# Patient Record
Sex: MALE | Race: WHITE | NOT HISPANIC OR LATINO | URBAN - METROPOLITAN AREA
[De-identification: names, ages, dates, MRNs, and addresses within clinical notes are randomized per-mention and may not be internally consistent; named-entity substitution may affect disease eponyms.]

---

## 2018-05-09 ENCOUNTER — OFFICE (OUTPATIENT)
Dept: URBAN - METROPOLITAN AREA CLINIC 42 | Facility: CLINIC | Age: 73
End: 2018-05-09

## 2018-05-09 VITALS — SYSTOLIC BLOOD PRESSURE: 139 MMHG | WEIGHT: 180 LBS | DIASTOLIC BLOOD PRESSURE: 87 MMHG | HEART RATE: 76 BPM

## 2018-05-09 DIAGNOSIS — K64.4 RESIDUAL HEMORRHOIDAL SKIN TAGS: ICD-10-CM

## 2018-05-09 DIAGNOSIS — Z86.010 PERSONAL HISTORY OF COLONIC POLYPS: ICD-10-CM

## 2018-05-09 DIAGNOSIS — Z80.9 FAMILY HISTORY OF MALIGNANT NEOPLASM, UNSPECIFIED: ICD-10-CM

## 2018-05-09 PROCEDURE — 99213 OFFICE O/P EST LOW 20 MIN: CPT

## 2018-08-08 ENCOUNTER — ON CAMPUS - OUTPATIENT (OUTPATIENT)
Dept: URBAN - METROPOLITAN AREA HOSPITAL 91 | Facility: HOSPITAL | Age: 73
End: 2018-08-08
Payer: COMMERCIAL

## 2018-08-08 DIAGNOSIS — Z86.010 PERSONAL HISTORY OF COLONIC POLYPS: ICD-10-CM

## 2018-08-08 DIAGNOSIS — D12.3 BENIGN NEOPLASM OF TRANSVERSE COLON: ICD-10-CM

## 2018-08-08 DIAGNOSIS — D12.2 BENIGN NEOPLASM OF ASCENDING COLON: ICD-10-CM

## 2018-08-08 DIAGNOSIS — K57.30 DIVERTICULOSIS OF LARGE INTESTINE WITHOUT PERFORATION OR ABS: ICD-10-CM

## 2018-08-08 PROCEDURE — 45385 COLONOSCOPY W/LESION REMOVAL: CPT | Mod: PT

## 2019-08-07 ENCOUNTER — OFFICE (OUTPATIENT)
Dept: URBAN - METROPOLITAN AREA CLINIC 42 | Facility: CLINIC | Age: 74
End: 2019-08-07

## 2019-08-07 VITALS
SYSTOLIC BLOOD PRESSURE: 133 MMHG | HEIGHT: 72 IN | TEMPERATURE: 97.9 F | HEART RATE: 72 BPM | WEIGHT: 188 LBS | DIASTOLIC BLOOD PRESSURE: 65 MMHG

## 2019-08-07 DIAGNOSIS — K21.9 GASTRO-ESOPHAGEAL REFLUX DISEASE WITHOUT ESOPHAGITIS: ICD-10-CM

## 2019-08-07 DIAGNOSIS — K30 FUNCTIONAL DYSPEPSIA: ICD-10-CM

## 2019-08-07 PROCEDURE — 99213 OFFICE O/P EST LOW 20 MIN: CPT

## 2019-08-07 RX ORDER — OMEPRAZOLE 20 MG/1
CAPSULE, DELAYED RELEASE ORAL
Qty: 90 | Refills: 3 | Status: ACTIVE

## 2023-04-05 ENCOUNTER — HOSPITAL ENCOUNTER (OUTPATIENT)
Dept: PHYSICAL THERAPY | Facility: HOSPITAL | Age: 78
Setting detail: THERAPIES SERIES
Discharge: HOME | End: 2023-04-05
Payer: MEDICARE

## 2023-04-05 DIAGNOSIS — S72.001D CLOSED FRACTURE OF NECK OF RIGHT FEMUR WITH ROUTINE HEALING, SUBSEQUENT ENCOUNTER: Primary | ICD-10-CM

## 2023-04-05 PROCEDURE — 97110 THERAPEUTIC EXERCISES: CPT | Performed by: PHYSICAL THERAPIST

## 2023-04-05 PROCEDURE — 97161 PT EVAL LOW COMPLEX 20 MIN: CPT | Performed by: PHYSICAL THERAPIST

## 2023-04-05 NOTE — THERAPY EVALUATION
Outpatient Physical Therapy Ortho Initial Evaluation   Mary Turner     Patient Name: Tapan Joel  : 1945  MRN: [id number]  Today's Date: 2023      Visit Date: 2023    There is no problem list on file for this patient.       No past medical history on file.     No past surgical history on file.    Visit Dx:     ICD-10-CM ICD-9-CM   1. Closed fracture of neck of right femur with routine healing, subsequent encounter  S72.001D V54.13          Patient History     Row Name 23 0930             History    Chief Complaint Difficulty Walking;Difficulty with daily activities;Pain;Muscle weakness  -GC      Type of Pain Hip pain  right  -GC      Brief Description of Current Complaint Pt states he slipped on ice and fell landing on his right hip 2023. He underwent ORIF of lright femopral neck fracture on . he hospitalized for several days before spending 22 days in a [facility] facility. He is now referred of [facility] patient therapy.  -      Patient/Caregiver Goals Relieve pain;Return to prior level of function;Improve mobility;Improve strength  -GC      Patient's Rating of General Health Good  -GC      Hand Dominance right-handed  -GC      What clinical tests have you had for this problem? X-ray  -         Pain     Pain Location Hip  right  -GC      Pain at Present 3  -GC      Pain at Best 2  -GC      Pain at Worst 10  -GC      Pain Frequency Constant/continuous  -GC      Pain Description Aching;Discomfort;Sore;Sharp  -GC      What Performance Factors Make the Current Problem(s) WORSE? Pt c/o pain with walking, lifting his right LE  -GC      What Performance Factors Make the Current Problem(s) BETTER? Pt feels best at rest  -GC      Difficulties with ADL's? Pt has some difficulty with LE dressing, transitional movements  -         Daily Activities    Primary Language English  -GC      Are you able to read Yes  -GC      Are you able to write Yes  -GC      Teaching needs identified Home Exercise  Program;Management of Condition  -      Patient is concerned about/has problems with Difficulty with self care (i.e. bathing, dressing, toileting:;Performing home management (household chores, shopping, care of dependents);Performing job responsibilities/community activities (work, school,;Standing;Walking;Transfers (getting out of a chair, bed)  -GC      Does patient have problems with the following? None  -GC      Barriers to learning None  -GC      Functional Status mobility issues preventing performance of daily activities  -      Pt Participated in POC and Goals Yes  -GC         Safety    Are you being hurt, hit, or frightened by anyone at home or in your life? No  -GC      Are you being neglected by a caregiver No  -GC            User Key  (r) = Recorded By, (t) = Taken By, (c) = Cosigned By    Initials Name Provider Type    Brando Meyers, PT Physical Therapist                                Therapy Education  Given: HEP, Symptoms/condition management, Pain management  Program: New  How Provided: Verbal, Demonstration, Written  Provided to: Patient  Level of Understanding: Teach back education performed, Verbalized, Demonstrated      PT OP Goals     Row Name 04/05/23 0930          PT Short Term Goals    STG Date to Achieve 05/03/23  -     STG 1 Decrease right hip pain to 3-4/10 with activity.  -     STG 2 Increase right hips strength to at least 4/5 all planes with testing.  -     STG 3 Pt will be independent with sit to/from supine transfers.  -     STG 4 Increase right hip AROM to WFL all planes with testing.  -     STG 5 Pt will be independent with his HEP issued by this therapist.  -        Long Term Goals    LTG Date to Achieve 05/31/23  -     LTG 1 Decrease right hip pain to 0-1/10 with activity.  -     LTG 2 Increase right hips strength to at least 4+/5 all planes with testing.  -     LTG 3 Pt will ambulate nromally on levels and stairs without assistive device.  -     LTG 4  Pt will be independent with all ADLs and hav ea LEFS score > 65.  -        Time Calculation    PT Goal Re-Cert Due Date 05/03/23  -           User Key  (r) = Recorded By, (t) = Taken By, (c) = Cosigned By    Initials Name Provider Type     Brando Garcia, PT Physical Therapist                 PT Assessment/Plan     Row Name 04/05/23 0930          PT Assessment    Functional Limitations Impaired gait;Limitation in home management;Limitations in community activities;Limitations in functional capacity and performance;Performance in leisure activities;Performance in self-care ADL  -     Impairments Range of motion;Pain;Muscle strength;Gait  -     Assessment Comments Pt presents approximately 8 weeks s/p ORIF of right femoral neck fracture. He is currently ambulating with a straight cane wiht significant antalgic gait. He requires assistance with his right LE with transitional movements. He rates his hip pan up to 10/10 at times and has decreased right hip ROM, decreased right LE strength, decreased ambulatory status, and decreased function secodnary to the above.  -     Rehab Potential Good  -     Patient/caregiver participated in establishment of treatment plan and goals Yes  -     Patient would benefit from skilled therapy intervention Yes  -        PT Plan    PT Frequency 1x/week;2x/week  -     Predicted Duration of Therapy Intervention (PT) 6 weeks  -     Planned CPT's? PT EVAL LOW COMPLEXITY: 38710;PT THER PROC EA 15 MIN: 08206;PT ELECTRICAL STIM UNATTEND: ;PT ELECTRICAL STIM ATTD EA 15 MIN: 46296  -     PT Plan Comments Pt is to continue his HEP daily  -           User Key  (r) = Recorded By, (t) = Taken By, (c) = Cosigned By    Initials Name Provider Type     Brando aGrcia, PT Physical Therapist                   OP Exercises     Row Name 04/05/23 0930             Exercise 1    Exercise Name 1 Supine clam shell vs theraband  -      Cueing 1 Verbal;Tactile  -      Reps 1 25   -GC      Time 1 green  -GC         Exercise 2    Exercise Name 2 Bridge vs theraband  -GC      Cueing 2 Verbal;Tactile  -GC      Reps 2 25  -GC      Time 2 green  -GC         Exercise 3    Exercise Name 3 Bridge with ball squeeze  -GC      Cueing 3 Verbal;Tactile  -GC      Reps 3 25  -GC         Exercise 4    Exercise Name 4 SLR  -GC      Cueing 4 Verbal;Tactile  -GC      Sets 4 2  -GC      Reps 4 10  -GC         Exercise 5    Exercise Name 5 Sidelying hip ABD  -GC      Cueing 5 Verbal;Tactile  -GC      Reps 5 20  -GC         Exercise 6    Exercise Name 6 SAQ  -GC      Cueing 6 Verbal;Tactile  -GC      Reps 6 25  -GC         Exercise 7    Exercise Name 7 LAQ  -GC      Cueing 7 Verbal;Tactile  -GC      Reps 7 25  -GC            User Key  (r) = Recorded By, (t) = Taken By, (c) = Cosigned By    Initials Name Provider Type    Brando Meyers PT Physical Therapist                              Outcome Measure Options: Lower Extremity Functional Scale (LEFS)  Lower Extremity Functional Index  Getting into or out of the bath: Moderate difficulty  Walking between rooms: A little bit of difficulty  Putting on your shoes or socks: A little bit of difficulty  Squatting: A little bit of difficulty  Lifting an object, like a bag of groceries from the floor: Quite a bit of difficulty  Performing light activities around your home: Quite a bit of difficulty  Performing heavy activities around your home: Quite a bit of difficulty  Getting into or out of a car: Quite a bit of difficulty  Walking 2 blocks: Quite a bit of difficulty  Walking a mile: Extreme difficulty or unable to perform activity  Going up or down 10 stairs (about 1 flight of stairs): Quite a bit of difficulty  Standing for 1 hour: Extreme difficulty or unable to perform activity  Sitting for 1 hour: Moderate difficulty  Running on even ground: Extreme difficulty or unable to perform activity  Running on uneven ground: Extreme difficulty or unable to perform  activity  Making sharp turns while running fast: Extreme difficulty or unable to perform activity  Hopping: Extreme difficulty or unable to perform activity  Rolling over in bed: Moderate difficulty      Time Calculation:     Start Time: 0930  Stop Time: 1026  Time Calculation (min): 56 min     Therapy Charges for Today     Code Description Service Date Service Provider Modifiers Qty    42994621466 HC PT EVAL LOW COMPLEXITY 2 4/5/2023 Brando Garcia, PT GP 1    20702611975 HC PT THER PROC EA 15 MIN 4/5/2023 Brando Garcia, PT GP 1          PT G-Codes  Outcome Measure Options: Lower Extremity Functional Scale (LEFS)         Brando Garcia, PT  4/5/2023

## 2023-04-10 ENCOUNTER — HOSPITAL ENCOUNTER (OUTPATIENT)
Dept: PHYSICAL THERAPY | Facility: HOSPITAL | Age: 78
Setting detail: THERAPIES SERIES
Discharge: HOME | End: 2023-04-10
Payer: MEDICARE

## 2023-04-10 DIAGNOSIS — S72.001D CLOSED FRACTURE OF NECK OF RIGHT FEMUR WITH ROUTINE HEALING, SUBSEQUENT ENCOUNTER: Primary | ICD-10-CM

## 2023-04-10 PROCEDURE — 97110 THERAPEUTIC EXERCISES: CPT | Performed by: PHYSICAL THERAPIST

## 2023-04-13 ENCOUNTER — HOSPITAL ENCOUNTER (OUTPATIENT)
Dept: PHYSICAL THERAPY | Facility: HOSPITAL | Age: 78
Setting detail: THERAPIES SERIES
Discharge: HOME | End: 2023-04-13
Payer: MEDICARE

## 2023-04-13 DIAGNOSIS — S72.001D CLOSED FRACTURE OF NECK OF RIGHT FEMUR WITH ROUTINE HEALING, SUBSEQUENT ENCOUNTER: Primary | ICD-10-CM

## 2023-04-13 PROCEDURE — 97110 THERAPEUTIC EXERCISES: CPT | Performed by: PHYSICAL THERAPIST

## 2023-04-13 NOTE — THERAPY TREATMENT NOTE
Outpatient Physical Therapy Ortho Treatment Note  [facility] [place]     Patient Name: Tapan Joel  : 1945  MRN: [id number]  Today's Date: 2023      Visit Date: 2023    Visit Dx:    ICD-10-CM ICD-9-CM   1. Closed fracture of neck of right femur with routine healing, subsequent encounter  S72.001D V54.13       There is no problem list on file for this patient.       No past medical history on file.     No past surgical history on file.                     PT Assessment/Plan     Row Name 23 0915          PT Assessment    Assessment Comments Pt is doing well with improving function and good tolerance to his exercise progression.  -GC        PT Plan    PT Plan Comments Pt is to continue his HEP daily.  -GC           User Key  (r) = Recorded By, (t) = Taken By, (c) = Cosigned By    Initials Name Provider Type    Brando Meyers, PT Physical Therapist                   OP Exercises     Row Name 23 0915             Subjective Comments    Subjective Comments Pt states he was able to walk up a hill this morning without too much difficulty.  -GC         Exercise 1    Exercise Name 1 Supine clam shell vs theraband  -GC      Cueing 1 Verbal;Tactile  -GC      Reps 1 30  -GC      Time 1 blue  -GC         Exercise 2    Exercise Name 2 Bridge vs theraband  -GC      Cueing 2 Verbal;Tactile  -GC      Reps 2 30  -GC      Time 2 blue  -GC         Exercise 3    Exercise Name 3 Bridge with ball squeeze  -GC      Cueing 3 Verbal;Tactile  -GC      Reps 3 30  -GC         Exercise 4    Exercise Name 4 SLR  -GC      Cueing 4 Verbal;Tactile  -GC      Reps 4 30  -GC         Exercise 5    Exercise Name 5 Sidelying hip ABD  -GC      Cueing 5 Verbal;Tactile  -GC      Reps 5 30  -GC         Exercise 6    Exercise Name 6 SAQ  -GC      Cueing 6 Verbal;Tactile  -GC      Reps 6 30  -GC         Exercise 7    Exercise Name 7 LAQ  -GC      Cueing 7 Verbal;Tactile  -GC      Reps 7 30  -GC         Exercise 8    Exercise Name 8  "Hip ABD in standing vs theraband  -GC      Cueing 8 Verbal;Tactile  -GC      Reps 8 25  -GC      Time 8 blue  -GC         Exercise 9    Exercise Name 9 Hip EXT in standing vs theraband  -GC      Cueing 9 Verbal;Tactile  -GC      Reps 9 25  -GC      Time 9 blue  -GC         Exercise 10    Exercise Name 10 Prone hip EXT  -GC      Cueing 10 Verbal;Tactile  -GC      Reps 10 30  -GC         Exercise 11    Exercise Name 11 Forward step ups on 6\" step  -GC      Cueing 11 Verbal;Tactile;Demo  -GC      Reps 11 10x ea  -GC         Exercise 12    Exercise Name 12 Lateral step overs on 6\" step  -GC      Cueing 12 Verbal;Tactile;Demo  -GC      Time 12 10x  -GC            User Key  (r) = Recorded By, (t) = Taken By, (c) = Cosigned By    Initials Name Provider Type     Brando Garcia, PT Physical Therapist                                                Time Calculation:   Start Time: 0915  Stop Time: 0958  Time Calculation (min): 43 min  Therapy Charges for Today     Code Description Service Date Service Provider Modifiers Qty    04853211614  PT THER PROC EA 15 MIN 4/13/2023 Brando Garcia, PT GP 2                    Brando Garcia, PT  4/13/2023     "

## 2023-04-18 ENCOUNTER — HOSPITAL ENCOUNTER (OUTPATIENT)
Dept: PHYSICAL THERAPY | Facility: HOSPITAL | Age: 78
Setting detail: THERAPIES SERIES
Discharge: HOME | End: 2023-04-18
Payer: MEDICARE

## 2023-04-18 DIAGNOSIS — S72.001D CLOSED FRACTURE OF NECK OF RIGHT FEMUR WITH ROUTINE HEALING, SUBSEQUENT ENCOUNTER: Primary | ICD-10-CM

## 2023-04-18 PROCEDURE — 97110 THERAPEUTIC EXERCISES: CPT

## 2023-04-18 NOTE — THERAPY TREATMENT NOTE
Outpatient Physical Therapy Ortho Treatment Note  [facility]ER [place]     Patient Name: Tapan Joel  : 1945  MRN: [id number]  Today's Date: 2023      Visit Date: 2023    Visit Dx:    ICD-10-CM ICD-9-CM   1. Closed fracture of neck of right femur with routine healing, subsequent encounter  S72.001D V54.13       There is no problem list on file for this patient.       No past medical history on file.     No past surgical history on file.                     PT Assessment/Plan     Row Name 23 0945          PT Assessment    Assessment Comments Pt shows improved tolerance and strength with prescribed exercises. Pt completed ther ex (B) LE  -KM        PT Plan    PT Plan Comments Continue per POC  -KM           User Key  (r) = Recorded By, (t) = Taken By, (c) = Cosigned By    Initials Name Provider Type    Bushra Bryant PTA Physical Therapist Assistant                   OP Exercises     Row Name 23 6145             Subjective Comments    Subjective Comments Pt states his hip is doing well and has been compliant with HEP  -KM         Exercise 1    Exercise Name 1 Supine clam shell vs theraband  -KM      Cueing 1 Verbal;Tactile  -KM      Reps 1 30  -KM      Time 1 blue  -KM         Exercise 2    Exercise Name 2 Bridge vs theraband  -KM      Cueing 2 Verbal;Tactile  -KM      Reps 2 30  -KM      Time 2 blue  -KM         Exercise 3    Exercise Name 3 Bridge with ball squeeze  -KM      Cueing 3 Verbal;Tactile  -KM      Reps 3 30  -KM         Exercise 4    Exercise Name 4 SLR  -KM      Cueing 4 Verbal;Tactile  -KM      Reps 4 30  -KM         Exercise 5    Exercise Name 5 Sidelying hip ABD  -KM      Cueing 5 Verbal;Tactile  -KM      Reps 5 30  -KM         Exercise 6    Exercise Name 6 SAQ  -KM      Cueing 6 Verbal;Tactile  -KM      Reps 6 30  -KM         Exercise 7    Exercise Name 7 LAQ  -KM      Cueing 7 Verbal;Tactile  -KM      Reps 7 30  -KM         Exercise 8    Exercise Name 8 Hip ABD in  "standing vs theraband  -KM      Cueing 8 Verbal;Tactile  -KM      Reps 8 30  -KM      Time 8 blue  -KM         Exercise 9    Exercise Name 9 Hip EXT in standing vs theraband  -KM      Cueing 9 Verbal;Tactile  -KM      Reps 9 30  -KM      Time 9 blue  -KM         Exercise 10    Exercise Name 10 Prone hip EXT  -KM      Cueing 10 Verbal;Tactile  -KM      Reps 10 30  -KM         Exercise 11    Exercise Name 11 Forward step ups on 6\" step  -KM      Cueing 11 Verbal;Tactile;Demo  -KM      Reps 11 15x ea  -KM         Exercise 12    Exercise Name 12 Lateral step overs on 6\" step  -KM      Cueing 12 Verbal;Tactile;Demo  -KM      Time 12 15x  -KM            User Key  (r) = Recorded By, (t) = Taken By, (c) = Cosigned By    Initials Name Provider Type    Bushra Bryant PTA Physical Therapist Assistant                                                Time Calculation:   Start Time: 0945  Stop Time: 1027  Time Calculation (min): 42 min  Therapy Charges for Today     Code Description Service Date Service Provider Modifiers Qty    76728496522 HC PT THER PROC EA 15 MIN 4/18/2023 Bushra Guadarrama PTA GP 1                    Bushra Guadarrama PTA  4/18/2023     "

## 2023-04-20 ENCOUNTER — HOSPITAL ENCOUNTER (OUTPATIENT)
Dept: PHYSICAL THERAPY | Facility: HOSPITAL | Age: 78
Setting detail: THERAPIES SERIES
Discharge: HOME | End: 2023-04-20
Payer: MEDICARE

## 2023-04-20 DIAGNOSIS — S72.001D CLOSED FRACTURE OF NECK OF RIGHT FEMUR WITH ROUTINE HEALING, SUBSEQUENT ENCOUNTER: Primary | ICD-10-CM

## 2023-04-20 PROCEDURE — 97110 THERAPEUTIC EXERCISES: CPT | Performed by: PHYSICAL THERAPIST

## 2023-04-20 NOTE — THERAPY TREATMENT NOTE
Outpatient Physical Therapy Ortho Treatment Note  [facility]IFER [facility]     Patient Name: Tapan Joel  : 1945  MRN: [id number]  Today's Date: 2023      Visit Date: 2023    Visit Dx:    ICD-10-CM ICD-9-CM   1. Closed fracture of neck of right femur with routine healing, subsequent encounter  S72.001D V54.13       There is no problem list on file for this patient.       No past medical history on file.     No past surgical history on file.     PT Ortho     Row Name 23 0900       MMT Right Lower Ext    Rt Hip Flexion MMT, Gross Movement (3+/5) fair plus  -GC    Rt Hip Extension MMT, Gross Movement (3/5) fair  -GC    Rt Hip ABduction MMT, Gross Movement (3+/5) fair plus  -GC       Transfers    Comment, (Transfers) Pt is independent with all bed mobility and transfers  -       Gait/Stairs (Locomotion)    Comment, (Gait/Stairs) Pt ambulates with mild antalgic gait using a straight cane. He has a positive Trendelenburg ambulating without assistive device  -GC          User Key  (r) = Recorded By, (t) = Taken By, (c) = Cosigned By    Initials Name Provider Type     Brando Garcia, PT Physical Therapist                             PT Assessment/Plan     Row Name 23          PT Assessment    Assessment Comments Pt is doing well with increased hip strength and improved functional mobility.  -GC        PT Plan    PT Plan Comments Pt is to continue his HEP daily.  -           User Key  (r) = Recorded By, (t) = Taken By, (c) = Cosigned By    Initials Name Provider Type    Brando Meyers, PT Physical Therapist                   OP Exercises     Row Name 23             Exercise 1    Exercise Name 1 Supine clam shell vs theraband  -GC      Cueing 1 Verbal;Tactile  -GC      Reps 1 30  -GC      Time 1 black  -GC         Exercise 2    Exercise Name 2 Bridge vs theraband  -GC      Cueing 2 Verbal;Tactile  -GC      Reps 2 30  -GC      Time 2 black  -GC         Exercise 3    Exercise Name  "3 Bridge with ball squeeze  -GC      Cueing 3 Verbal;Tactile  -GC      Reps 3 30  -GC         Exercise 4    Exercise Name 4 SLR  -GC      Cueing 4 Verbal;Tactile  -GC      Reps 4 30  -GC         Exercise 5    Exercise Name 5 Sidelying hip ABD  -GC      Cueing 5 Verbal;Tactile  -GC      Reps 5 30  -GC         Exercise 6    Exercise Name 6 SAQ  -GC      Cueing 6 Verbal;Tactile  -GC      Reps 6 30  -GC      Time 6 2#  -GC         Exercise 7    Exercise Name 7 LAQ  -GC      Cueing 7 Verbal;Tactile  -GC      Reps 7 30  -GC      Time 7 2#  -GC         Exercise 8    Exercise Name 8 Hip ABD in standing vs theraband  -GC      Cueing 8 Verbal;Tactile  -GC      Reps 8 30  -GC      Time 8 blue  -GC         Exercise 9    Exercise Name 9 Hip EXT in standing vs theraband  -GC      Cueing 9 Verbal;Tactile  -GC      Reps 9 30  -GC      Time 9 blue  -GC         Exercise 10    Exercise Name 10 Prone hip EXT  -GC      Cueing 10 Verbal;Tactile  -GC      Reps 10 30  -GC         Exercise 11    Exercise Name 11 Forward step ups on 6\" step  -GC      Cueing 11 Verbal;Tactile;Demo  -GC      Reps 11 15x ea  -GC         Exercise 12    Exercise Name 12 Lateral step overs on 6\" step  -GC      Cueing 12 Verbal;Tactile;Demo  -GC      Time 12 15x  -GC         Exercise 13    Exercise Name 13 Heel Raises  -GC      Cueing 13 Verbal;Tactile;Demo  -GC      Reps 13 30  -GC         Exercise 14    Exercise Name 14 Partial squats  -GC      Cueing 14 Verbal;Tactile;Demo  -GC      Reps 14 30  -GC            User Key  (r) = Recorded By, (t) = Taken By, (c) = Cosigned By    Initials Name Provider Type     Brando Garcia, PT Physical Therapist                                                Time Calculation:   Start Time: 0930  Stop Time: 1016  Time Calculation (min): 46 min  Therapy Charges for Today     Code Description Service Date Service Provider Modifiers Qty    55507221344 HC PT THER PROC EA 15 MIN 4/20/2023 Brando Garcia, PT GP 2                    Brando " Jose, PT  4/20/2023

## 2023-04-25 ENCOUNTER — HOSPITAL ENCOUNTER (OUTPATIENT)
Dept: PHYSICAL THERAPY | Facility: HOSPITAL | Age: 78
Setting detail: THERAPIES SERIES
Discharge: HOME | End: 2023-04-25
Payer: MEDICARE

## 2023-04-25 DIAGNOSIS — S72.001D CLOSED FRACTURE OF NECK OF RIGHT FEMUR WITH ROUTINE HEALING, SUBSEQUENT ENCOUNTER: Primary | ICD-10-CM

## 2023-04-25 PROCEDURE — 97110 THERAPEUTIC EXERCISES: CPT | Performed by: PHYSICAL THERAPIST

## 2023-04-27 ENCOUNTER — APPOINTMENT (OUTPATIENT)
Dept: PHYSICAL THERAPY | Facility: HOSPITAL | Age: 78
End: 2023-04-27
Payer: MEDICARE

## 2023-04-28 ENCOUNTER — HOSPITAL ENCOUNTER (OUTPATIENT)
Dept: PHYSICAL THERAPY | Facility: HOSPITAL | Age: 78
Setting detail: THERAPIES SERIES
Discharge: HOME | End: 2023-04-28
Payer: MEDICARE

## 2023-04-28 DIAGNOSIS — S72.001D CLOSED FRACTURE OF NECK OF RIGHT FEMUR WITH ROUTINE HEALING, SUBSEQUENT ENCOUNTER: Primary | ICD-10-CM

## 2023-04-28 PROCEDURE — 97110 THERAPEUTIC EXERCISES: CPT

## 2023-04-28 NOTE — THERAPY TREATMENT NOTE
Outpatient Physical Therapy Ortho Treatment Note  JENNIFER [facility]     Patient Name: Tapan Joel  : 1945  MRN: [id number]  Today's Date: 2023      Visit Date: 2023    Visit Dx:    ICD-10-CM ICD-9-CM   1. Closed fracture of neck of right femur with routine healing, subsequent encounter  S72.001D V54.13       There is no problem list on file for this patient.       No past medical history on file.     No past surgical history on file.                     PT Assessment/Plan     Row Name 23 1030          PT Assessment    Assessment Comments Pt ambulates with improved gait with minimal use of AD. Pt shows improved strength and stability with ther ex  -KM        PT Plan    PT Plan Comments With pts improved status, plan to see pt 1x/week. Pt to continue with HEP  -KM           User Key  (r) = Recorded By, (t) = Taken By, (c) = Cosigned By    Initials Name Provider Type    Bushra Bryant PTA Physical Therapist Assistant                   OP Exercises     Row Name 23 1030             Subjective Comments    Subjective Comments Pt states his hip is doing well.  -KM         Exercise 1    Exercise Name 1 Supine clam shell vs theraband  -KM      Cueing 1 Verbal;Tactile  -KM      Reps 1 30  -KM      Time 1 black  -KM         Exercise 2    Exercise Name 2 Bridge vs theraband  -KM      Cueing 2 Verbal;Tactile  -KM      Reps 2 30  -KM      Time 2 black  -KM         Exercise 3    Exercise Name 3 Bridge with ball squeeze  -KM      Cueing 3 Verbal;Tactile  -KM      Reps 3 30  -KM         Exercise 4    Exercise Name 4 SLR  -KM      Cueing 4 Verbal;Tactile  -KM      Reps 4 30  -KM      Time 4 1#  -KM         Exercise 5    Exercise Name 5 Sidelying hip ABD  -KM      Cueing 5 Verbal;Tactile  -KM      Reps 5 30  -KM      Time 5 1#  -KM         Exercise 6    Exercise Name 6 SAQ  -KM      Cueing 6 Verbal;Tactile  -KM      Reps 6 30  -KM      Time 6 5#  -KM         Exercise 7    Exercise Name 7 LAQ  -KM       "Cueing 7 Verbal;Tactile  -KM      Reps 7 30  -KM      Time 7 5#  -KM         Exercise 8    Exercise Name 8 Hip ABD in standing vs theraband  -KM      Cueing 8 Verbal;Tactile  -KM      Reps 8 30  -KM      Time 8 black  -KM         Exercise 9    Exercise Name 9 Hip EXT in standing vs theraband  -KM      Cueing 9 Verbal;Tactile  -KM      Reps 9 30  -KM      Time 9 black  -KM         Exercise 10    Exercise Name 10 Prone hip EXT  -KM      Cueing 10 Verbal;Tactile  -KM      Reps 10 30  -KM      Time 10 1#  -KM         Exercise 11    Exercise Name 11 Forward step ups on 6\" step  -KM      Cueing 11 Verbal;Tactile;Demo  -KM      Reps 11 15x ea  -KM         Exercise 12    Exercise Name 12 Lateral step overs on 6\" step  -KM      Cueing 12 Verbal;Tactile;Demo  -KM      Time 12 15x  -KM         Exercise 13    Exercise Name 13 Heel Raises  -KM      Cueing 13 Verbal;Tactile;Demo  -KM      Reps 13 30  -KM         Exercise 14    Exercise Name 14 Partial squats  -KM      Cueing 14 Verbal;Tactile;Demo  -KM      Reps 14 30  -KM            User Key  (r) = Recorded By, (t) = Taken By, (c) = Cosigned By    Initials Name Provider Type    Bushra Bryant PTA Physical Therapist Assistant                                                Time Calculation:   Start Time: 1030  Stop Time: 1122  Time Calculation (min): 52 min  Therapy Charges for Today     Code Description Service Date Service Provider Modifiers Qty    04029170444 HC PT THER PROC EA 15 MIN 4/28/2023 Bushra Guadarrama PTA GP 2                    Bushra Guadarrama PTA  4/28/2023     "

## 2023-05-02 ENCOUNTER — HOSPITAL ENCOUNTER (OUTPATIENT)
Dept: PHYSICAL THERAPY | Facility: HOSPITAL | Age: 78
Setting detail: THERAPIES SERIES
Discharge: HOME | End: 2023-05-02
Payer: MEDICARE

## 2023-05-02 DIAGNOSIS — S72.001D CLOSED FRACTURE OF NECK OF RIGHT FEMUR WITH ROUTINE HEALING, SUBSEQUENT ENCOUNTER: Primary | ICD-10-CM

## 2023-05-02 PROCEDURE — 97110 THERAPEUTIC EXERCISES: CPT

## 2023-05-02 NOTE — THERAPY TREATMENT NOTE
Outpatient Physical Therapy Ortho Treatment Note  [place] [place]     Patient Name: Tapan Joel  : 1945  MRN: [id number]  Today's Date: 2023      Visit Date: 2023    Visit Dx:    ICD-10-CM ICD-9-CM   1. Closed fracture of neck of right femur with routine healing, subsequent encounter  S72.001D V54.13       There is no problem list on file for this patient.       No past medical history on file.     No past surgical history on file.                     PT Assessment/Plan     Row Name 23 0905          PT Assessment    Assessment Comments Pt has made great progress toward goals. Pt shows improved strength and functional mobility; pt able to kneel down and tie his show.  -KM        PT Plan    PT Plan Comments With pts improved status, plan to transition to HEP.  -KM           User Key  (r) = Recorded By, (t) = Taken By, (c) = Cosigned By    Initials Name Provider Type    Bushra Bryant PTA Physical Therapist Assistant                   OP Exercises     Row Name 23             Subjective Comments    Subjective Comments Pt states his hip continues to do well and does not experience any limitations with daily activities  -KM         Exercise 1    Exercise Name 1 Supine clam shell vs theraband  -KM      Cueing 1 Verbal;Tactile  -KM      Reps 1 30  -KM      Time 1 black  -KM         Exercise 2    Exercise Name 2 Bridge vs theraband  -KM      Cueing 2 Verbal;Tactile  -KM      Reps 2 30  -KM      Time 2 black  -KM         Exercise 3    Exercise Name 3 Bridge with ball squeeze  -KM      Cueing 3 Verbal;Tactile  -KM      Reps 3 30  -KM         Exercise 4    Exercise Name 4 SLR  -KM      Cueing 4 Verbal;Tactile  -KM      Reps 4 30  -KM      Time 4 1#  -KM         Exercise 5    Exercise Name 5 Sidelying hip ABD  -KM      Cueing 5 Verbal;Tactile  -KM      Reps 5 30  -KM      Time 5 1#  -KM         Exercise 6    Exercise Name 6 SAQ  -KM      Cueing 6 Verbal;Tactile  -KM      Reps 6 30  -KM    "   Time 6 5#  -KM         Exercise 7    Exercise Name 7 LAQ  -KM      Cueing 7 Verbal;Tactile  -KM      Reps 7 30  -KM      Time 7 5#  -KM         Exercise 8    Exercise Name 8 Hip ABD in standing vs theraband  -KM      Cueing 8 Verbal;Tactile  -KM      Reps 8 30  -KM      Time 8 black  -KM         Exercise 9    Exercise Name 9 Hip EXT in standing vs theraband  -KM      Cueing 9 Verbal;Tactile  -KM      Reps 9 30  -KM      Time 9 black  -KM         Exercise 10    Exercise Name 10 Prone hip EXT  -KM      Cueing 10 Verbal;Tactile  -KM      Reps 10 30  -KM      Time 10 1#  -KM         Exercise 11    Exercise Name 11 Forward step ups on 6\" step  -KM      Cueing 11 Verbal;Tactile;Demo  -KM      Reps 11 15x ea  -KM         Exercise 12    Exercise Name 12 Lateral step overs on 6\" step  -KM      Cueing 12 Verbal;Tactile;Demo  -KM      Time 12 15x  -KM         Exercise 13    Exercise Name 13 Heel Raises  -KM      Cueing 13 Verbal;Tactile;Demo  -KM      Reps 13 30  -KM         Exercise 14    Exercise Name 14 Partial squats  -KM      Cueing 14 Verbal;Tactile;Demo  -KM      Reps 14 30  -KM            User Key  (r) = Recorded By, (t) = Taken By, (c) = Cosigned By    Initials Name Provider Type    Bushra Bryant, PTA Physical Therapist Assistant                              PT OP Goals     Row Name 05/02/23 0905          PT Short Term Goals    STG Date to Achieve 05/03/23  -KM     STG 1 Decrease right hip pain to 3-4/10 with activity.  -KM     STG 1 Progress Met  -KM     STG 2 Increase right hips strength to at least 4/5 all planes with testing.  -KM     STG 2 Progress Met  -KM     STG 3 Pt will be independent with sit to/from supine transfers.  -KM     STG 3 Progress Met  -KM     STG 4 Increase right hip AROM to WFL all planes with testing.  -KM     STG 4 Progress Met  -KM     STG 5 Pt will be independent with his HEP issued by this therapist.  -KM     STG 5 Progress Met  -KM        Long Term Goals    LTG Date to Achieve " 05/31/23  -KM     LTG 1 Decrease right hip pain to 0-1/10 with activity.  -KM     LTG 1 Progress Met  -KM     LTG 2 Increase right hips strength to at least 4+/5 all planes with testing.  -KM     LTG 2 Progress Met  -KM     LTG 3 Pt will ambulate nromally on levels and stairs without assistive device.  -KM     LTG 3 Progress Partially Met  -KM     LTG 4 Pt will be independent with all ADLs and hav ea LEFS score > 65.  -KM     LTG 4 Progress Met  -KM           User Key  (r) = Recorded By, (t) = Taken By, (c) = Cosigned By    Initials Name Provider Type    Bushra Bryant PTA Physical Therapist Assistant                               Time Calculation:   Start Time: 0905  Stop Time: 0955  Time Calculation (min): 50 min  Therapy Charges for Today     Code Description Service Date Service Provider Modifiers Qty    94920911344 HC PT THER PROC EA 15 MIN 5/2/2023 Bushra Guadarrama PTA GP 1                    Bushra Guadarrama PTA  5/2/2023

## 2023-08-12 NOTE — THERAPY TREATMENT NOTE
Outpatient Physical Therapy Ortho Treatment Note  [facility]ER [facility]     Patient Name: Tapan Joel  : 1945  MRN: [id number]  Today's Date: 4/10/2023      Visit Date: 04/10/2023    Visit Dx:    ICD-10-CM ICD-9-CM   1. Closed fracture of neck of right femur with routine healing, subsequent encounter  S72.001D V54.13       There is no problem list on file for this patient.       No past medical history on file.     No past surgical history on file.                     PT Assessment/Plan     Row Name 04/10/23 0915          PT Assessment    Assessment Comments Pt tolerated his exercise progression well.  -GC        PT Plan    PT Plan Comments Pt is to continue his HEP daily.  -GC           User Key  (r) = Recorded By, (t) = Taken By, (c) = Cosigned By    Initials Name Provider Type    Brando Meyers, PT Physical Therapist                   OP Exercises     Row Name 04/10/23 0915             Exercise 1    Exercise Name 1 Supine clam shell vs theraband  -GC      Cueing 1 Verbal;Tactile  -GC      Reps 1 30  -GC      Time 1 green  -GC         Exercise 2    Exercise Name 2 Bridge vs theraband  -GC      Cueing 2 Verbal;Tactile  -GC      Reps 2 30  -GC      Time 2 green  -GC         Exercise 3    Exercise Name 3 Bridge with ball squeeze  -GC      Cueing 3 Verbal;Tactile  -GC      Reps 3 30  -GC         Exercise 4    Exercise Name 4 SLR  -GC      Cueing 4 Verbal;Tactile  -GC      Reps 4 25  -GC         Exercise 5    Exercise Name 5 Sidelying hip ABD  -GC      Cueing 5 Verbal;Tactile  -GC      Reps 5 25  -GC         Exercise 6    Exercise Name 6 SAQ  -GC      Cueing 6 Verbal;Tactile  -GC      Reps 6 30  -GC         Exercise 7    Exercise Name 7 LAQ  -GC      Cueing 7 Verbal;Tactile  -GC      Reps 7 30  -GC         Exercise 8    Exercise Name 8 Hip ABD in standing vs theraband  -GC      Cueing 8 Verbal;Tactile  -GC      Reps 8 25  -GC      Time 8 green  -GC         Exercise 9    Exercise Name 9 Hip EXT in standing vs  Observation goals  PRIOR TO DISCHARGE       Comments:      -diagnostic tests and consults completed and resulted: Partially  met. Pending placement.      -vital signs normal or at patient baseline: Partially met, slightly elevated BP.      -tolerating oral intake to maintain hydration: met     -adequate pain control on oral analgesics: met     -returns to baseline functional status: not met     -safe disposition plan has been identified: not met  Nurse to notify provider when observation goals have been met and patient is ready for discharge.        theraband  -GC      Cueing 9 Verbal;Tactile  -GC      Reps 9 25  -GC      Time 9 green  -GC            User Key  (r) = Recorded By, (t) = Taken By, (c) = Cosigned By    Initials Name Provider Type    GC Brando Garcia, PT Physical Therapist                                                Time Calculation:   Start Time: 0915  Stop Time: 0957  Time Calculation (min): 42 min  Therapy Charges for Today     Code Description Service Date Service Provider Modifiers Qty    [id number]  PT THER PROC EA 15 MIN 4/10/2023 Brando Garcia, PT GP 2                    Brando Garcia, PT  4/10/2023